# Patient Record
Sex: FEMALE | Employment: FULL TIME | URBAN - METROPOLITAN AREA
[De-identification: names, ages, dates, MRNs, and addresses within clinical notes are randomized per-mention and may not be internally consistent; named-entity substitution may affect disease eponyms.]

---

## 2021-01-06 ENCOUNTER — TRANSFERRED RECORDS (OUTPATIENT)
Dept: HEALTH INFORMATION MANAGEMENT | Facility: CLINIC | Age: 56
End: 2021-01-06

## 2021-03-17 ENCOUNTER — TRANSFERRED RECORDS (OUTPATIENT)
Dept: HEALTH INFORMATION MANAGEMENT | Facility: CLINIC | Age: 56
End: 2021-03-17

## 2021-03-19 ENCOUNTER — TRANSCRIBE ORDERS (OUTPATIENT)
Dept: OTHER | Age: 56
End: 2021-03-19

## 2021-03-19 ENCOUNTER — TELEPHONE (OUTPATIENT)
Dept: GASTROENTEROLOGY | Facility: CLINIC | Age: 56
End: 2021-03-19

## 2021-03-19 DIAGNOSIS — K86.1 CHRONIC RECURRENT PANCREATITIS (H): Primary | ICD-10-CM

## 2021-03-19 DIAGNOSIS — K76.0 NAFLD (NONALCOHOLIC FATTY LIVER DISEASE): ICD-10-CM

## 2021-03-19 NOTE — TELEPHONE ENCOUNTER
[facility] Intake form:    Referring/Requesting Provider: Dr. Na Gamboa  Referral Received via: fax    [facility]: [facility]    Phone Number: [phone]    Fax Number: [phone]    Address: [address] [place] [address], [address]    Requested provider (if specified): No Preference    Urgency: No      Indication/Diagnosis for consultation: EUS    Has patient been evaluated by another Gastroenterologist? Unknown    [facility]   [facility]   Attn:  Quynh Giles  [address]   2nd Floor, [id number]   [place] [address]       [facility]  [facility]   Surgery Clinic: [facility]  Attn:  Brett Bartholomew or Immanuel  4th Floor, [id number]  [address] [place] [address]    Is patient aware of request for clinc consultation and ok to be contacted to schedule? Yes - if pt is not aware of request, inform referring office to inform patient that they will be contacted to schedule once request has been reviewed.     Inform referring clinic of the following and provided fax number to: [facility] - [phone]    READ TO REFERRING CLINIC:  Due to high demands for our services our clinic requires all records including imaging studies be mailed and faxed to our facility prior to scheduling. Records should be faxed within 24-72 hours of referral if possible and images should be pushed to our PACS system or received by mail within 72 hours of referral. Our office will NOT call to follow up or request records.  Our clinic will not be able to process, schedule or contact patient without records and Images for MD review process. Once records have been reviewed and recommendation/orders have been made the patient will be contacted to schedule. If records have not been received within 2 weeks of initial referral call,  referring office will be notified by letter and referral will be closed. If an appointment is unable to be offered at this time we will inform the referring office and patient via letter.

## 2021-03-22 NOTE — TELEPHONE ENCOUNTER
[facility]     Referring provider: Dr. Na Gamboa    Referred to: [facility]     Provider Requested: Quynh     Referral Received: 3/19/21     Records received: in CareEverywhere     Images received: none    Evaluation for: EUS     Clinical History (per RN review):     Admission 1/6/21  55 y.o. female with past medical history of gallstone pancreatitis and previous cholecystectomy as well as c difficile colitis, IBS, and prior UTIs presented to the emergency department on 1/6 with right sided abdominal pain radiating to the back with associated nausea. Lipase was slightly elevated at 170. CT abdomen showed localized stranding at the head of the pancreas consistent with pancreatitis. RUQ US with mild intrahepatic ductal dilation    MRCP 1/29/21  Impression:  1. Mildly increased T2 signal within the uncinate process of the pancreas could represent focal inflammation. Correlate with laboratory markers to exclude pancreatitis. No peripancreatic inflammatory changes.   2. Mild hepatomegaly with moderate diffuse hepatic steatosis.    -I am placing referral to Dr. Beauchamp at the [facility] for an endoscopic ultrasound to get a closer look at the pancreas given your recent episode of pancreatitis  -I have ordered you for some blood work and stool studies. Please make a lab appointment for this  -I have also ordered you for a FibroScan of the liver to get a better estimate of how much scar tissue may be in the liver.  -Treatment for fatty liver is diet, exercise, and weight loss. Goal weight loss is about 10% of your starting body weight which for you would be about 20 pounds.  -Follow-up with me in 2 months    Abdominal US 1/6/21  IMPRESSION:  1.  Mild heterogeneity of the pancreas likely related to recently seen manifestations of pancreatitis on CT.  2.  Hepatic steatosis.  3.  Mild biliary ectasia likely related to postcholecystectomy status.    Abd/pelvis CT  1/6/21  IMPRESSION:   1.  CT findings compatible with focal pancreatitis. No fluid collections.    Imaging not in PACS, have been requested from [facility]    MD review date:   MD Decision for clinic consultation/Orders:            Referral updates/Patient contacted:

## 2021-04-06 ENCOUNTER — PATIENT OUTREACH (OUTPATIENT)
Dept: GASTROENTEROLOGY | Facility: CLINIC | Age: 56
End: 2021-04-06

## 2021-04-07 NOTE — PROGRESS NOTES
Left message on VM to discuss referral to Dr Beauchamp, potential plan for EUS    Will attempt to call again    Vilma Schmitt, RN, BSN,   [facility]  Care coordinator   [phone]  Fax [phone]

## 2021-04-12 ENCOUNTER — PATIENT OUTREACH (OUTPATIENT)
Dept: GASTROENTEROLOGY | Facility: CLINIC | Age: 56
End: 2021-04-12

## 2021-04-12 DIAGNOSIS — K85.90 PANCREATITIS: Primary | ICD-10-CM

## 2021-04-12 DIAGNOSIS — Z11.59 ENCOUNTER FOR SCREENING FOR OTHER VIRAL DISEASES: ICD-10-CM

## 2021-04-12 NOTE — PROGRESS NOTES
Called to discuss with patient.  EUS with MAC in GI lab    Explained they can expect a call from  for date and time of procedure, will need a , someone to stay with them for 24 hours and should stay in town for 24 hours (within 45 min of Hospital) post procedure    Patient needs to get pre-op physical completed. If outside  health system will need physical faxed to number [phone]   If you do not get a preop physical, your procedure could be cancelled, patient voiced understanding    Preop Plan: At [facility], [facility] or [place]    Med Review    Blood thinner -  none  ASA - none  Diabetic - none    COVID test discussed: yes, discussed needs to be no more then 4 days    Patient Education r/t procedure: no mychart, discussed over the phone    Does patient have any history of gastric bypass/gastric surgery/altered panc/bili anatomy? None     A pre-op nurse will call 1-2 days prior to the procedure. Is advised to be NPO/no solid food 8 hours before the procedure. Ok to drink clear liquids (Water, Apple Juice or Gatorade) up to 2 hours prior to procedure.     Verbalized understanding of all instructions. All questions answered.      Gastro order placed, message sent to endo     Vilma Schmitt RN, BSN,   [facility]  Care coordinator   [phone]  Fax [phone]

## 2021-04-21 ENCOUNTER — TELEPHONE (OUTPATIENT)
Dept: GASTROENTEROLOGY | Facility: CLINIC | Age: 56
End: 2021-04-21

## 2021-04-21 NOTE — TELEPHONE ENCOUNTER
Writer reviewed pre-assessment questions with patient prior to upcoming EUS on 4.27.2021.  Pt has Pre-Op tomorrow, 4.22.2021 at [facility] with Dr. Billings.  Pt was given [facility] fax number to send results.  Pt stated she plans to get her COVID test done tomorrow at her appt.      Reviewed EUS prep instructions with patient.    EUS instructions resent to pt via letter; pt states she has not received these instructions yet.    Patient verbalized understanding.  No further questions or concerns.    Pam Valdes RN

## 2021-04-27 ENCOUNTER — HOSPITAL ENCOUNTER (OUTPATIENT)
Facility: CLINIC | Age: 56
Discharge: HOME | End: 2021-04-27
Attending: INTERNAL MEDICINE | Admitting: INTERNAL MEDICINE
Payer: COMMERCIAL

## 2021-04-27 ENCOUNTER — ANESTHESIA (OUTPATIENT)
Dept: GASTROENTEROLOGY | Facility: CLINIC | Age: 56
End: 2021-04-27
Payer: COMMERCIAL

## 2021-04-27 ENCOUNTER — ANESTHESIA EVENT (OUTPATIENT)
Dept: GASTROENTEROLOGY | Facility: CLINIC | Age: 56
End: 2021-04-27
Payer: COMMERCIAL

## 2021-04-27 VITALS
DIASTOLIC BLOOD PRESSURE: 67 MMHG | RESPIRATION RATE: 16 BRPM | TEMPERATURE: 98.3 F | OXYGEN SATURATION: 97 % | SYSTOLIC BLOOD PRESSURE: 107 MMHG | HEART RATE: 72 BPM

## 2021-04-27 LAB — UPPER EUS: NORMAL

## 2021-04-27 PROCEDURE — 258N000003 HC RX IP 258 OP 636: Performed by: NURSE ANESTHETIST, CERTIFIED REGISTERED

## 2021-04-27 PROCEDURE — 88305 TISSUE EXAM BY PATHOLOGIST: CPT | Mod: 26 | Performed by: PATHOLOGY

## 2021-04-27 PROCEDURE — 43239 EGD BIOPSY SINGLE/MULTIPLE: CPT | Mod: XS

## 2021-04-27 PROCEDURE — 43237 ENDOSCOPIC US EXAM ESOPH: CPT | Performed by: INTERNAL MEDICINE

## 2021-04-27 PROCEDURE — 250N000011 HC RX IP 250 OP 636: Performed by: NURSE ANESTHETIST, CERTIFIED REGISTERED

## 2021-04-27 PROCEDURE — 43259 EGD US EXAM DUODENUM/JEJUNUM: CPT | Performed by: INTERNAL MEDICINE

## 2021-04-27 PROCEDURE — 250N000009 HC RX 250: Performed by: NURSE ANESTHETIST, CERTIFIED REGISTERED

## 2021-04-27 PROCEDURE — 88305 TISSUE EXAM BY PATHOLOGIST: CPT | Mod: TC | Performed by: INTERNAL MEDICINE

## 2021-04-27 PROCEDURE — 370N000017 HC ANESTHESIA TECHNICAL FEE, PER MIN: Performed by: INTERNAL MEDICINE

## 2021-04-27 RX ORDER — ONDANSETRON 2 MG/ML
INJECTION INTRAMUSCULAR; INTRAVENOUS PRN
Status: DISCONTINUED | OUTPATIENT
Start: 2021-04-27 | End: 2021-04-27

## 2021-04-27 RX ORDER — LIDOCAINE HYDROCHLORIDE 20 MG/ML
INJECTION, SOLUTION INFILTRATION; PERINEURAL PRN
Status: DISCONTINUED | OUTPATIENT
Start: 2021-04-27 | End: 2021-04-27

## 2021-04-27 RX ORDER — NALOXONE HYDROCHLORIDE 0.4 MG/ML
0.4 INJECTION, SOLUTION INTRAMUSCULAR; INTRAVENOUS; SUBCUTANEOUS
Status: DISCONTINUED | OUTPATIENT
Start: 2021-04-27 | End: 2021-04-27 | Stop reason: HOSPADM

## 2021-04-27 RX ORDER — ONDANSETRON 4 MG/1
4 TABLET, ORALLY DISINTEGRATING ORAL EVERY 30 MIN PRN
Status: DISCONTINUED | OUTPATIENT
Start: 2021-04-27 | End: 2021-04-27 | Stop reason: HOSPADM

## 2021-04-27 RX ORDER — NALOXONE HYDROCHLORIDE 0.4 MG/ML
0.2 INJECTION, SOLUTION INTRAMUSCULAR; INTRAVENOUS; SUBCUTANEOUS
Status: DISCONTINUED | OUTPATIENT
Start: 2021-04-27 | End: 2021-04-27 | Stop reason: HOSPADM

## 2021-04-27 RX ORDER — LEVOTHYROXINE SODIUM 75 UG/1
75 TABLET ORAL DAILY
COMMUNITY
Start: 2021-01-14

## 2021-04-27 RX ORDER — ONDANSETRON 2 MG/ML
4 INJECTION INTRAMUSCULAR; INTRAVENOUS EVERY 30 MIN PRN
Status: DISCONTINUED | OUTPATIENT
Start: 2021-04-27 | End: 2021-04-27 | Stop reason: HOSPADM

## 2021-04-27 RX ORDER — IBUPROFEN 800 MG/1
800 TABLET, FILM COATED ORAL 4 TIMES DAILY PRN
COMMUNITY
Start: 2020-12-03

## 2021-04-27 RX ORDER — FLUMAZENIL 0.1 MG/ML
0.2 INJECTION, SOLUTION INTRAVENOUS
Status: DISCONTINUED | OUTPATIENT
Start: 2021-04-27 | End: 2021-04-27 | Stop reason: HOSPADM

## 2021-04-27 RX ORDER — GLYCOPYRROLATE 0.2 MG/ML
INJECTION, SOLUTION INTRAMUSCULAR; INTRAVENOUS PRN
Status: DISCONTINUED | OUTPATIENT
Start: 2021-04-27 | End: 2021-04-27

## 2021-04-27 RX ORDER — VENLAFAXINE HYDROCHLORIDE 37.5 MG/1
37.5 CAPSULE, EXTENDED RELEASE ORAL DAILY
COMMUNITY
Start: 2021-01-14

## 2021-04-27 RX ORDER — SODIUM CHLORIDE, SODIUM LACTATE, POTASSIUM CHLORIDE, CALCIUM CHLORIDE 600; 310; 30; 20 MG/100ML; MG/100ML; MG/100ML; MG/100ML
INJECTION, SOLUTION INTRAVENOUS CONTINUOUS PRN
Status: DISCONTINUED | OUTPATIENT
Start: 2021-04-27 | End: 2021-04-27

## 2021-04-27 RX ORDER — METHOCARBAMOL 750 MG/1
750 TABLET, FILM COATED ORAL 2 TIMES DAILY PRN
COMMUNITY
Start: 2020-12-03

## 2021-04-27 RX ORDER — PROPOFOL 10 MG/ML
INJECTION, EMULSION INTRAVENOUS PRN
Status: DISCONTINUED | OUTPATIENT
Start: 2021-04-27 | End: 2021-04-27

## 2021-04-27 RX ORDER — LIDOCAINE 40 MG/G
CREAM TOPICAL
Status: DISCONTINUED | OUTPATIENT
Start: 2021-04-27 | End: 2021-04-27 | Stop reason: HOSPADM

## 2021-04-27 RX ORDER — SODIUM CHLORIDE, SODIUM LACTATE, POTASSIUM CHLORIDE, CALCIUM CHLORIDE 600; 310; 30; 20 MG/100ML; MG/100ML; MG/100ML; MG/100ML
INJECTION, SOLUTION INTRAVENOUS CONTINUOUS
Status: DISCONTINUED | OUTPATIENT
Start: 2021-04-27 | End: 2021-04-27 | Stop reason: HOSPADM

## 2021-04-27 RX ORDER — PROPOFOL 10 MG/ML
INJECTION, EMULSION INTRAVENOUS CONTINUOUS PRN
Status: DISCONTINUED | OUTPATIENT
Start: 2021-04-27 | End: 2021-04-27

## 2021-04-27 RX ORDER — MEPERIDINE HYDROCHLORIDE 25 MG/ML
12.5 INJECTION INTRAMUSCULAR; INTRAVENOUS; SUBCUTANEOUS
Status: DISCONTINUED | OUTPATIENT
Start: 2021-04-27 | End: 2021-04-27 | Stop reason: HOSPADM

## 2021-04-27 RX ADMIN — PROPOFOL 150 MCG/KG/MIN: 10 INJECTION, EMULSION INTRAVENOUS at 12:35

## 2021-04-27 RX ADMIN — PROPOFOL 40 MG: 10 INJECTION, EMULSION INTRAVENOUS at 12:53

## 2021-04-27 RX ADMIN — GLYCOPYRROLATE 0.1 MG: 0.2 INJECTION, SOLUTION INTRAMUSCULAR; INTRAVENOUS at 12:34

## 2021-04-27 RX ADMIN — SODIUM CHLORIDE, POTASSIUM CHLORIDE, SODIUM LACTATE AND CALCIUM CHLORIDE: 600; 310; 30; 20 INJECTION, SOLUTION INTRAVENOUS at 12:31

## 2021-04-27 RX ADMIN — GLYCOPYRROLATE 0.1 MG: 0.2 INJECTION, SOLUTION INTRAMUSCULAR; INTRAVENOUS at 12:44

## 2021-04-27 RX ADMIN — PROPOFOL 10 MG: 10 INJECTION, EMULSION INTRAVENOUS at 12:59

## 2021-04-27 RX ADMIN — ONDANSETRON 4 MG: 2 INJECTION INTRAMUSCULAR; INTRAVENOUS at 12:37

## 2021-04-27 RX ADMIN — PROPOFOL 20 MG: 10 INJECTION, EMULSION INTRAVENOUS at 12:57

## 2021-04-27 RX ADMIN — LIDOCAINE HYDROCHLORIDE 100 MG: 20 INJECTION, SOLUTION INFILTRATION; PERINEURAL at 12:35

## 2021-04-27 RX ADMIN — PROPOFOL 30 MG: 10 INJECTION, EMULSION INTRAVENOUS at 12:56

## 2021-04-27 RX ADMIN — TOPICAL ANESTHETIC 1 EACH: 200 SPRAY DENTAL; PERIODONTAL at 12:32

## 2021-04-27 ASSESSMENT — LIFESTYLE VARIABLES: TOBACCO_USE: 1

## 2021-04-27 NOTE — ANESTHESIA PREPROCEDURE EVALUATION
Anesthesia Pre-Procedure Evaluation    Patient: Eileen Smith   MRN: [id number] : 1965        Preoperative Diagnosis: Pancreatitis [K85.90]   Procedure : Procedure(s):  ENDOSCOPIC ULTRASOUND, ESOPHAGOSCOPY / UPPER GASTROINTESTINAL TRACT (GI)     No past medical history on file.   No past surgical history on file.   Not on File   Social History     Tobacco Use     Smoking status: Not on file   Substance Use Topics     Alcohol use: Not on file      Wt Readings from Last 1 Encounters:   11 74 kg (163 lb 2.3 oz)        Anesthesia Evaluation   Pt has had prior anesthetic. Type: General and MAC.        ROS/MED HX  ENT/Pulmonary:     (+) sleep apnea, RYDER risk factors, tobacco use, recent URI,     Neurologic:     (+) migraines,     Cardiovascular:       METS/Exercise Tolerance:     Hematologic:       Musculoskeletal:       GI/Hepatic:     (+) Inflammatory bowel disease, cholecystitis/cholelithiasis,  (-) appendicitis   Renal/Genitourinary:       Endo:     (+) thyroid problem, hypothyroidism,     Psychiatric/Substance Use:     (+) psychiatric history anxiety and depression     Infectious Disease:       Malignancy:   (+) Malignancy, History of GI.    Other:      (+) , H/O Chronic Pain,        Physical Exam    Airway        Mallampati: II   TM distance: > 3 FB   Neck ROM: full   Mouth opening: > 3 cm    Respiratory Devices and Support         Dental           Cardiovascular          Rhythm and rate: regular and normal     Pulmonary   pulmonary exam normal        breath sounds clear to auscultation           OUTSIDE LABS:  CBC:   Lab Results   Component Value Date    WBC 9.2 2011    HGB 12.1 2011    HCT 37.4 2011     2011     BMP:   Lab Results   Component Value Date     2011    POTASSIUM 4.4 2011    CHLORIDE 105 2011    CO2 27 2011    BUN 11 2011    CR 0.86 2011    GLC 85 2011     COAGS: No results found for: PTT, INR, FIBR  POC: No  results found for: BGM, HCG, HCGS  HEPATIC:   Lab Results   Component Value Date    ALBUMIN 3.7 (L) 02/07/2011    PROTTOTAL 6.8 02/07/2011    ALT 12 02/07/2011    AST 11 02/07/2011    ALKPHOS 52 02/07/2011    BILITOTAL 0.3 02/07/2011     OTHER:   Lab Results   Component Value Date    ANH 8.9 02/07/2011    TSH 2.08 02/07/2011       Anesthesia Plan    ASA Status:  3      Anesthesia Type: MAC.     - Reason for MAC: chronic cardiopulmonary disease   Induction: Intravenous, Propofol.   Maintenance: TIVA.        Consents    Anesthesia Plan(s) and associated risks, benefits, and realistic alternatives discussed. Questions answered and patient/representative(s) expressed understanding.     - Discussed with:  Patient      - Extended Intubation/Ventilatory Support Discussed: No.      - Patient is DNR/DNI Status: No    Use of blood products discussed: No .     Postoperative Care            Comments:                Dayne Liu MD

## 2021-04-27 NOTE — ANESTHESIA POSTPROCEDURE EVALUATION
Patient: Eileen Smith    Procedure(s):  ENDOSCOPIC ULTRASOUND, ESOPHAGOSCOPY / UPPER GASTROINTESTINAL TRACT (GI)    Diagnosis:Pancreatitis [K85.90]  Diagnosis Additional Information: No value filed.    Anesthesia Type:  No value filed.    Note:  Disposition: Outpatient   Postop Pain Control: Uneventful            Sign Out: Well controlled pain   PONV: No   Neuro/Psych: Uneventful            Sign Out: Acceptable/Baseline neuro status   Airway/Respiratory: Uneventful            Sign Out: Acceptable/Baseline resp. status   CV/Hemodynamics: Uneventful            Sign Out: Acceptable CV status; No obvious hypovolemia; No obvious fluid overload   Other NRE: NONE   DID A NON-ROUTINE EVENT OCCUR? No           Last vitals:  Vitals:    04/27/21 0930   BP: (!) 146/88   Pulse: 89   Resp: 20   Temp: 36.8  C (98.3  F)   SpO2: 97%       Last vitals prior to Anesthesia Care Transfer:  CRNA VITALS  4/27/2021 1247 - 4/27/2021 1325      4/27/2021             Pulse:  70    SpO2:  98 %          Electronically Signed By: Dayne Liu MD  April 27, 2021  1:25 PM

## 2021-04-27 NOTE — DISCHARGE INSTRUCTIONS
Discharge Instructions after Endoscopic Ultrasound    Activity  You were given medicine for pain. You may be dizzy or sleepy.    For 24 hours:    Do not drive or use heavy equipment.    Do not make important decisions.    Do not drink any alcohol.    Diet  Wait one hour before eating or drinking. Start with sips of water. When your gag reflex has returned you  may go back to your usual diet, medicines and light exercise.    Discomfort    Some bloating is normal. You may have large burps or pass air.    You may have a sore throat for 2 to 3 days. It may help to:    Avoid hot liquids for 24 hours.    Use sore throat lozenges.    Gargle as needed with salt water up to 4 times a day. Mix 1 cup of warm water with 1 teaspoon of salt. Do not swallow.    You may take Tylenol (acetaminophen) for pain unless your doctor has told you not to.        Follow-up  __x_ We took small tissue or fluid samples to study. We will call you with the results in about 10 working days.    When to call    Call right away if you have:    Severe throat pain or trouble swallowing    Black stools (tar-like looking bowel movement)    Fever above 100.6 F (37.5 C)    Unusual pain in belly or chest not relieved by belching or passing air.    If you vomit blood or have severe pain, go to an emergency room.    If you have questions, call    Monday to Friday, 8 a.m. to 4:30 p.m.: Endoscopy: [phone]  After hours: Hospital: [phone] (Ask for the GI fellow on call)

## 2021-04-27 NOTE — ANESTHESIA CARE TRANSFER NOTE
Patient: Eileen Smith    Procedure(s):  ENDOSCOPIC ULTRASOUND, ESOPHAGOSCOPY / UPPER GASTROINTESTINAL TRACT (GI)    Diagnosis: Pancreatitis [K85.90]  Diagnosis Additional Information: No value filed.    Anesthesia Type:   No value filed.     Note:    Oropharynx: oropharynx clear of all foreign objects and spontaneously breathing  Level of Consciousness: awake  Oxygen Supplementation: room air      Dentition: dentition unchanged  Vital Signs Stable: post-procedure vital signs reviewed and stable  Report to RN Given: handoff report given  Patient transferred to: Phase II    Handoff Report: Identifed the Patient, Identified the Reponsible Provider, Reviewed the pertinent medical history, Discussed the surgical course, Reviewed Intra-OP anesthesia mangement and issues during anesthesia, Set expectations for post-procedure period and Allowed opportunity for questions and acknowledgement of understanding      Vitals: (Last set prior to Anesthesia Care Transfer)  CRNA VITALS  4/27/2021 1247 - 4/27/2021 1327      4/27/2021             Pulse:  70    SpO2:  98 %        Electronically Signed By: KAROLYN Gay CRNA  April 27, 2021  1:27 PM

## 2021-04-27 NOTE — LETTER
April 21, 2021      Eileen Smith  [address] [place] [address]              Dear Eileen,      TIME-SENSITIVE Upper GI Endoscopy Ultrasound Appointment Information & Prep Instructions     Procedure: Upper GI Endoscopy Ultrasound  Date: Tuesday, April 27th  Check-in Time: 9:30 AM  Provider: Dr. Beacuhamp  Location: [address] [address]  1st Floor, [address]  Questions: [phone]-[phone].       [email or web address]            You must have a COVID-19 PCR test before your exam. This COVID test must be done 48-96 hours before your scheduled procedure. This test is not arranged by the GI/Endoscopy team. You should receive a call from our Scheduling team to arrange this test within the 1-3 days. Check your voice mail.  If you have not scheduled your COVID-19 PCR test, please call [phone] immediately.     Having a COVID-19 PCR before your exam helps keep us all safe.  If you miss your test, we may need to cancel or reschedule your exam.   __________________________________________________       Thank you for scheduling your COVID-19 PCR test.  Having a COVID-19 PCR before your exam helps keep us all safe.  If you miss your test, we may need to cancel or reschedule your exam.      Your Exam Prep and Sedation Information are Below  _________________________________________________     IV Sedation by Anesthesia Information     Your doctor has asked that sedation for your procedure be provided by the [facility] Anesthesia department.  It is important that you arrange to have a complete physical exam before your procedure appointment. This includes the following:         Pre-operative physical within 30 days of your procedure.  This may be done any time after 04/12/21.  This may be done by your primary care provider or a nurse practitioner.          If you prefer, your pre-op physical may be completed at the  "[facility] pre-Anesthesia Clinic, sometimes called the \"PAC Clinic\".  To schedule an appointment with the PAC Clinic, call [phone].  This may be a good option for you if are unable to get an appointment in the required time with your Primary Care Provider.        If your Primary Care Provider is outside the [place] [facility] system, please have them complete the attached history and physical document and fax the completed form, along with your EKG to our office.  Our fax number is: [phone].        You must arrange for a responsible adult to take you home from the [facility] when your procedure is complete.  An adult must also stay with you for 24 hours after your procedure.  This does not have to be the same person.         Do not smoke, use chewing tobacco, chew gum, or suck on hard candy the morning of your procedure.   If you do your procedure may be cancelled.         If you are unable to make these arrangements, please call us and reschedule your procedure.          We are available to answer your questions or when needed, reschedule your appointment.  Our number is: [phone].       Due to our ongoing COVID safety concerns, your  may not be allowed to join you for this visit.  Our visitor policy is reviewed frequently for all locations offering Endoscopy services.  Your Pre-Assessment RN will be able to give you the most up-to-date information about visitors policies for your exam during your screening call.       ? Based on your scheduled appointment, your  is not allowed to accompany you on the day of your exam.  You will need to have their cell-phone number with you at the time of check-in so that they may be called when it is time to pick you up.  You should enter the building by the Main, [facility] ([facility]) entrance and check in with a [facility] [place] team member at the .  Wear a mask.  You will be directed on to your appointment after answering " a few screening questions.    ? If your  is allowed to accompany you on the day of your exam, you will both need to check in with a [facility] team member at the .  You should both wear a mask the entire time they are in the building and outside near others.   ____________________________________________        Endoscopic Ultrasound (EUS)     What is an Endoscopic Ultrasound (EUS)?         EUS is an ultrasound examination through a long flexible tube (endoscope) that goes through your mouth and down your throat after you are asleep.       The exam includes the upper part of your gastro-intestinal (GI) tract and surrounding organs.  For example, the esophagus (food tube), stomach, duodenum (the first part of the bowel), parts of the liver, entire pancreas, gallbladder, spleen and lymph nodes in this area.  The doctor will also talk to you and ask you to sign a consent form before the exam begins.   ________________________________________     IMMEDIATELY - Date: 04/12/21        Talk to your doctor:  If you take blood-thinners (such as Coumadin, Plavix, Xarelto), your prescription or schedule may need to change before the test. (see list below)     Continue taking prescribed aspirin; talk to your prescribing doctor with any concerns.      If you have diabetes: Ask to have your exam early in the morning.  Also, ask your doctor if you should change your diet or medicines.   _________________________________________     If you are taking blood-thinning medicines (anticoagulant or antiplatelet medications) such as:             *  aspirin           *  Brilinta (ticagrelor)          *  Coumadin (warfarin)          *  Effient (prasugrel)           *  Elizuis (apixaban)          *  heparin          *  Lovenox (enoxaparin)           *  persantine           *  Plavix (clopidogril)           *  Pletal (cilostazol)          *  Pradaxa (dabigatran)          *  Xarelto (rivaroxaban)        These medications  may need to be stopped up to week before your exam.   The amount of time the anticoagulation/ anti-platelet medication needs to be stopped depends on the drug and your health history.       Please call and verify with your Primary Care Provider or provider who manages your anti-coagulant therapy.     ________________________________________     If you are diabetic:        You may need to take some medicine that alters your blood sugar before the exam.       Contact your Primary Care Provider  or provider who manages your diabetes.           *  Tell your provider the date and time of your EUS procedure.           *  Request specific instructions for your insulin and/or oral hypoglycemic medicine use the day of your exam.          *  Your instructions should include when you should check your blood sugar, how much of your medication you should take, when you should take your medication and what you should do in case your blood sugar is low.  Remember. You will not be eating or drinking 8 hours before your exam.      ________________________________________        One day before the exam - Date: 04/26/21        Stop eating all solid foods at 10 p.m.   You may drink clear liquids.  (see below).      What are clear liquids?      You may have:      Water, tea, coffee (no cream)      Soda pop, Gatorade (not red or purple)      Clear nutrition drinks (Enlive, Resource Breeze)      Jell-O, Popsicles (no milk or fruit pieces) or sorbet (not red or purple)      Fat-free soup broth or bouillon     Plain hard candy, such as clear life savers (not red or purple)      Clear juices and fruit-flavored drinks such as apple juice, white grape juice, Hi-C and Reynaldo-Aid (not red or purple)     Do not have:     Milk or milk products such as ice b cream, malts or shakes     Red or purple drinks of any kind such as cranberry juice or grape juice.  Avoid red or purple Jell-O, Popsicles, Reynaldo-Aid, sorbet and candy.      Juices with pulp such  as orange, grapefruit, pineapple or tomato juice     Cream soups of any kind     Alcohol     _________________________________________     Day of the exam - Date: 04/27/21        Your stomach must be empty during the study.       You may drink clear liquids until 8 hours before your exam @ 11:00 AM.      If possible, do not take any medications until after the exam.            *  If you must a medicine, take it with only a few sips of water at least 2 hours before you check in.      If you take diabetes medicine (pills): do not take them the morning of your test.      Bring a list of your medicines and known allergies.      Please arrive with an adult who can take you home after the test: The medicine will make you sleepy.  If you do not have a , we may cancel your test.      You CANNOT drive yourself home after this exam.            *  Arrange for someone come with you or drive you home after the EUS.            *  This includes the use of public transportation.           *  If you do not have a responsible adult with you, your EUS will be rescheduled.       This designated person will also be responsible for gathering the post procedure results and recommendations from the doctor.      You must have a  for 6-24 hours after your procedure depending on the type of sedation used.   ________________________________________     During & Immediately After the Exam:        Often, a biopsy or tissue fluid sample is taken.   These samples are sent to a special lab for testing.       The exam usually takes 60-90 minutes.      After the procedure, you will remain in the RECOVERY AREA for about 30-90 minutes while you wake up from the sedation you were given.      You will talk to the Doctor at this time.       Your doctor will talk with you about when to expect results before you go home.  After Your EUS:   Activity:      You should not drive, operate machinery, make important decisions, or do activities that  require coordination or balance until the next day.   Eating:      Do not eat or drink anything during the first hour after the exam.  After the first hour, you may try sips of water.  This will depend on how quickly you are recovering from your exam sedation.       It is normal to burp large amounts of air.   Abdominal Cramping:      It is normal to have some abdominal cramping after the study.  This is due to air and water put into the intestines during the exam.       Walking and/or turning side to side will usually help the air to pass.  It also helps to lie on your side with your knee bent upward toward your chest.   ________________________________________     Sore Throat:        You may have a sore throat for one to two days.       Throat lozenges or warm saltwater gargles may help.      Mix   teaspoon salt in a glass of warm water, gargle and then spit.   Follow-up:     Go to the Emergency Department right away if you have any of these symptoms:           *  Chills and/or fever over  100.6 degrees F          *  Vomiting that does not stop or contains blood          *  New, moderate or severe abdominal pain          *  Black, bloody or tar-like stools (bowel movements).     When you arrive at the Emergency Department tell them that you had an Endoscopic Ultrasound (EUS).     If you have Chest Pain CALL 911

## 2021-04-30 LAB — COPATH REPORT: NORMAL

## 2021-05-24 ENCOUNTER — RECORDS - HEALTHEAST (OUTPATIENT)
Dept: ADMINISTRATIVE | Facility: CLINIC | Age: 56
End: 2021-05-24

## 2021-05-25 ENCOUNTER — RECORDS - HEALTHEAST (OUTPATIENT)
Dept: ADMINISTRATIVE | Facility: CLINIC | Age: 56
End: 2021-05-25

## 2021-05-26 ENCOUNTER — RECORDS - HEALTHEAST (OUTPATIENT)
Dept: ADMINISTRATIVE | Facility: CLINIC | Age: 56
End: 2021-05-26

## 2021-07-24 ENCOUNTER — HEALTH MAINTENANCE LETTER (OUTPATIENT)
Age: 56
End: 2021-07-24

## 2021-09-18 ENCOUNTER — HEALTH MAINTENANCE LETTER (OUTPATIENT)
Age: 56
End: 2021-09-18

## 2022-08-14 ENCOUNTER — HEALTH MAINTENANCE LETTER (OUTPATIENT)
Age: 57
End: 2022-08-14

## 2022-11-19 ENCOUNTER — HEALTH MAINTENANCE LETTER (OUTPATIENT)
Age: 57
End: 2022-11-19

## 2023-09-10 ENCOUNTER — HEALTH MAINTENANCE LETTER (OUTPATIENT)
Age: 58
End: 2023-09-10